# Patient Record
Sex: MALE | Race: WHITE | NOT HISPANIC OR LATINO | URBAN - METROPOLITAN AREA
[De-identification: names, ages, dates, MRNs, and addresses within clinical notes are randomized per-mention and may not be internally consistent; named-entity substitution may affect disease eponyms.]

---

## 2017-02-07 ENCOUNTER — OUTPATIENT (OUTPATIENT)
Dept: OUTPATIENT SERVICES | Facility: HOSPITAL | Age: 47
LOS: 1 days | End: 2017-02-07
Payer: COMMERCIAL

## 2017-02-07 VITALS
TEMPERATURE: 99 F | HEIGHT: 64 IN | SYSTOLIC BLOOD PRESSURE: 135 MMHG | DIASTOLIC BLOOD PRESSURE: 90 MMHG | WEIGHT: 198.42 LBS | OXYGEN SATURATION: 96 % | RESPIRATION RATE: 16 BRPM | HEART RATE: 108 BPM

## 2017-02-07 DIAGNOSIS — G47.33 OBSTRUCTIVE SLEEP APNEA (ADULT) (PEDIATRIC): ICD-10-CM

## 2017-02-07 DIAGNOSIS — Z01.818 ENCOUNTER FOR OTHER PREPROCEDURAL EXAMINATION: ICD-10-CM

## 2017-02-07 DIAGNOSIS — R00.0 TACHYCARDIA, UNSPECIFIED: ICD-10-CM

## 2017-02-07 DIAGNOSIS — S52.032D DISPLACED FRACTURE OF OLECRANON PROCESS WITH INTRAARTICULAR EXTENSION OF LEFT ULNA, SUBSEQUENT ENCOUNTER FOR CLOSED FRACTURE WITH ROUTINE HEALING: ICD-10-CM

## 2017-02-07 DIAGNOSIS — Z90.49 ACQUIRED ABSENCE OF OTHER SPECIFIED PARTS OF DIGESTIVE TRACT: Chronic | ICD-10-CM

## 2017-02-07 DIAGNOSIS — Z96.7 PRESENCE OF OTHER BONE AND TENDON IMPLANTS: Chronic | ICD-10-CM

## 2017-02-07 DIAGNOSIS — S42.409A UNSPECIFIED FRACTURE OF LOWER END OF UNSPECIFIED HUMERUS, INITIAL ENCOUNTER FOR CLOSED FRACTURE: ICD-10-CM

## 2017-02-07 DIAGNOSIS — I10 ESSENTIAL (PRIMARY) HYPERTENSION: ICD-10-CM

## 2017-02-07 DIAGNOSIS — E11.9 TYPE 2 DIABETES MELLITUS WITHOUT COMPLICATIONS: ICD-10-CM

## 2017-02-07 LAB
HCT VFR BLD CALC: 40.7 % — SIGNIFICANT CHANGE UP (ref 39–50)
HGB BLD-MCNC: 13.7 G/DL — SIGNIFICANT CHANGE UP (ref 13–17)
MCHC RBC-ENTMCNC: 28.5 PG — SIGNIFICANT CHANGE UP (ref 27–34)
MCHC RBC-ENTMCNC: 33.7 GM/DL — SIGNIFICANT CHANGE UP (ref 32–36)
MCV RBC AUTO: 84.8 FL — SIGNIFICANT CHANGE UP (ref 80–100)
PLATELET # BLD AUTO: 300 K/UL — SIGNIFICANT CHANGE UP (ref 150–400)
RBC # BLD: 4.8 M/UL — SIGNIFICANT CHANGE UP (ref 4.2–5.8)
RBC # FLD: 13.5 % — SIGNIFICANT CHANGE UP (ref 10.3–14.5)
WBC # BLD: 8.31 K/UL — SIGNIFICANT CHANGE UP (ref 3.8–10.5)
WBC # FLD AUTO: 8.31 K/UL — SIGNIFICANT CHANGE UP (ref 3.8–10.5)

## 2017-02-07 PROCEDURE — 93005 ELECTROCARDIOGRAM TRACING: CPT

## 2017-02-07 PROCEDURE — 85027 COMPLETE CBC AUTOMATED: CPT

## 2017-02-07 PROCEDURE — 83036 HEMOGLOBIN GLYCOSYLATED A1C: CPT

## 2017-02-07 PROCEDURE — G0463: CPT

## 2017-02-07 PROCEDURE — 36415 COLL VENOUS BLD VENIPUNCTURE: CPT

## 2017-02-07 PROCEDURE — 93010 ELECTROCARDIOGRAM REPORT: CPT

## 2017-02-07 PROCEDURE — 80048 BASIC METABOLIC PNL TOTAL CA: CPT

## 2017-02-07 RX ORDER — ASPIRIN/CALCIUM CARB/MAGNESIUM 324 MG
1 TABLET ORAL
Qty: 0 | Refills: 0 | COMMUNITY

## 2017-02-07 RX ORDER — CEFAZOLIN SODIUM 1 G
2000 VIAL (EA) INJECTION ONCE
Qty: 0 | Refills: 0 | Status: DISCONTINUED | OUTPATIENT
Start: 2017-02-08 | End: 2017-02-10

## 2017-02-07 NOTE — H&P PST ADULT - NSANTHOSAYNRD_GEN_A_CORE
No. SALVADOR screening performed.  STOP BANG Legend: 0-2 = LOW Risk; 3-4 = INTERMEDIATE Risk; 5-8 = HIGH Risk

## 2017-02-07 NOTE — H&P PST ADULT - HISTORY OF PRESENT ILLNESS
45 yo white male, PMH HTN, DM2 ( No Home BS monitoring) with mechanical fall at work 8/22/16 with left elbow fracture s/p ORIF of left elbow. Pt. returns to PST today for Removal of Hardware Left Elbow. Pt. denies recent fever, chills, SOB, chest pain, changes in bowel/urinary habits. 47 yo white male, PMH HTN, DM2 ( No Home BS monitoring) with mechanical fall at work 8/16 with left elbow fracture s/p ORIF of left elbow. Pt. reports limited ROM on left arm, pain with certain movement due to hardware. pt. returns to PST today for Removal of Hardware on Left Elbow. Pt. denies recent fever, chills, SOB, chest pain, changes in bowel/urinary habits.

## 2017-02-07 NOTE — H&P PST ADULT - PMH
Essential hypertension    H/O hyperlipidemia    Type 2 diabetes mellitus without complication, unspecified long term insulin use status DM2 (diabetes mellitus, type 2)    Essential hypertension    Fracture of elbow  left,  8/2016  H/O hyperlipidemia

## 2017-02-07 NOTE — H&P PST ADULT - PROBLEM SELECTOR PLAN 2
Pt.'s 's today, denies any symptoms (no lightheadedness, no fainting). Pt. had latte coffee this afternon and is nervous about surgery tomorrow. EKG done and revealed sinus tachycardia in the 114's. Later HR down to 106. Dr. Santillan notified and aware - pt. still denies any symptoms

## 2017-02-07 NOTE — H&P PST ADULT - PROBLEM SELECTOR PLAN 1
Removal of Hardware, Left Elbow  Pre-op teaching, including Chlorhexidine soap, provided - all questions answered  Pt. continued aspirin therapy during gustavo-op period

## 2017-02-07 NOTE — H&P PST ADULT - PSH
History of laparoscopic cholecystectomy  2010  S/P laparoscopic appendectomy  1999 History of laparoscopic cholecystectomy  2010  S/P laparoscopic appendectomy  1999  S/P ORIF (open reduction internal fixation) fracture  left elbow 8/2016

## 2017-02-07 NOTE — H&P PST ADULT - PROBLEM SELECTOR PLAN 4
Pt. takes hypoglycemics in the evening with dinner. FBS on arrival to [facility]

## 2017-02-07 NOTE — H&P PST ADULT - REASON FOR ADMISSION
"they are removing plate and screws from left elbow" "they are removing a plate and screws from left elbow"

## 2017-02-08 ENCOUNTER — INPATIENT (INPATIENT)
Facility: HOSPITAL | Age: 47
LOS: 1 days | Discharge: HOME | DRG: 508 | End: 2017-02-10
Attending: ORTHOPAEDIC SURGERY | Admitting: ORTHOPAEDIC SURGERY
Payer: COMMERCIAL

## 2017-02-08 VITALS
OXYGEN SATURATION: 96 % | HEIGHT: 64 IN | TEMPERATURE: 99 F | SYSTOLIC BLOOD PRESSURE: 122 MMHG | DIASTOLIC BLOOD PRESSURE: 84 MMHG | RESPIRATION RATE: 20 BRPM | HEART RATE: 99 BPM | WEIGHT: 198.42 LBS

## 2017-02-08 DIAGNOSIS — S52.032D DISPLACED FRACTURE OF OLECRANON PROCESS WITH INTRAARTICULAR EXTENSION OF LEFT ULNA, SUBSEQUENT ENCOUNTER FOR CLOSED FRACTURE WITH ROUTINE HEALING: ICD-10-CM

## 2017-02-08 DIAGNOSIS — Z01.818 ENCOUNTER FOR OTHER PREPROCEDURAL EXAMINATION: ICD-10-CM

## 2017-02-08 DIAGNOSIS — Z90.49 ACQUIRED ABSENCE OF OTHER SPECIFIED PARTS OF DIGESTIVE TRACT: Chronic | ICD-10-CM

## 2017-02-08 DIAGNOSIS — Z96.7 PRESENCE OF OTHER BONE AND TENDON IMPLANTS: Chronic | ICD-10-CM

## 2017-02-08 LAB
ANION GAP SERPL CALC-SCNC: 17 MMOL/L — SIGNIFICANT CHANGE UP (ref 5–17)
BUN SERPL-MCNC: 15 MG/DL — SIGNIFICANT CHANGE UP (ref 7–23)
CALCIUM SERPL-MCNC: 9.9 MG/DL — SIGNIFICANT CHANGE UP (ref 8.4–10.5)
CHLORIDE SERPL-SCNC: 97 MMOL/L — SIGNIFICANT CHANGE UP (ref 96–108)
CO2 SERPL-SCNC: 23 MMOL/L — SIGNIFICANT CHANGE UP (ref 22–31)
CREAT SERPL-MCNC: 0.91 MG/DL — SIGNIFICANT CHANGE UP (ref 0.5–1.3)
GLUCOSE SERPL-MCNC: 221 MG/DL — HIGH (ref 70–99)
HBA1C BLD-MCNC: 8.5 % — HIGH (ref 4–5.6)
POTASSIUM SERPL-MCNC: 4.4 MMOL/L — SIGNIFICANT CHANGE UP (ref 3.5–5.3)
POTASSIUM SERPL-SCNC: 4.4 MMOL/L — SIGNIFICANT CHANGE UP (ref 3.5–5.3)
SODIUM SERPL-SCNC: 137 MMOL/L — SIGNIFICANT CHANGE UP (ref 135–145)

## 2017-02-08 RX ORDER — ASPIRIN/CALCIUM CARB/MAGNESIUM 324 MG
81 TABLET ORAL DAILY
Qty: 0 | Refills: 0 | Status: DISCONTINUED | OUTPATIENT
Start: 2017-02-08 | End: 2017-02-10

## 2017-02-08 RX ORDER — FAMOTIDINE 10 MG/ML
20 INJECTION INTRAVENOUS ONCE
Qty: 0 | Refills: 0 | Status: COMPLETED | OUTPATIENT
Start: 2017-02-08 | End: 2017-02-08

## 2017-02-08 RX ORDER — METFORMIN HYDROCHLORIDE 850 MG/1
1000 TABLET ORAL
Qty: 0 | Refills: 0 | Status: DISCONTINUED | OUTPATIENT
Start: 2017-02-08 | End: 2017-02-08

## 2017-02-08 RX ORDER — DEXTROSE 50 % IN WATER 50 %
12.5 SYRINGE (ML) INTRAVENOUS ONCE
Qty: 0 | Refills: 0 | Status: DISCONTINUED | OUTPATIENT
Start: 2017-02-08 | End: 2017-02-10

## 2017-02-08 RX ORDER — LOSARTAN POTASSIUM 100 MG/1
50 TABLET, FILM COATED ORAL AT BEDTIME
Qty: 0 | Refills: 0 | Status: DISCONTINUED | OUTPATIENT
Start: 2017-02-08 | End: 2017-02-10

## 2017-02-08 RX ORDER — ATORVASTATIN CALCIUM 80 MG/1
80 TABLET, FILM COATED ORAL AT BEDTIME
Qty: 0 | Refills: 0 | Status: DISCONTINUED | OUTPATIENT
Start: 2017-02-08 | End: 2017-02-10

## 2017-02-08 RX ORDER — DEXTROSE 50 % IN WATER 50 %
1 SYRINGE (ML) INTRAVENOUS ONCE
Qty: 0 | Refills: 0 | Status: DISCONTINUED | OUTPATIENT
Start: 2017-02-08 | End: 2017-02-10

## 2017-02-08 RX ORDER — ACETAMINOPHEN 500 MG
650 TABLET ORAL EVERY 6 HOURS
Qty: 0 | Refills: 0 | Status: DISCONTINUED | OUTPATIENT
Start: 2017-02-08 | End: 2017-02-10

## 2017-02-08 RX ORDER — INSULIN LISPRO 100/ML
VIAL (ML) SUBCUTANEOUS
Qty: 0 | Refills: 0 | Status: DISCONTINUED | OUTPATIENT
Start: 2017-02-08 | End: 2017-02-08

## 2017-02-08 RX ORDER — CEFAZOLIN SODIUM 1 G
2000 VIAL (EA) INJECTION EVERY 8 HOURS
Qty: 0 | Refills: 0 | Status: COMPLETED | OUTPATIENT
Start: 2017-02-08 | End: 2017-02-08

## 2017-02-08 RX ORDER — HYDROMORPHONE HYDROCHLORIDE 2 MG/ML
0.5 INJECTION INTRAMUSCULAR; INTRAVENOUS; SUBCUTANEOUS
Qty: 0 | Refills: 0 | Status: DISCONTINUED | OUTPATIENT
Start: 2017-02-08 | End: 2017-02-08

## 2017-02-08 RX ORDER — ASPIRIN/CALCIUM CARB/MAGNESIUM 324 MG
1 TABLET ORAL
Qty: 0 | Refills: 0 | COMMUNITY

## 2017-02-08 RX ORDER — ONDANSETRON 8 MG/1
4 TABLET, FILM COATED ORAL EVERY 6 HOURS
Qty: 0 | Refills: 0 | Status: DISCONTINUED | OUTPATIENT
Start: 2017-02-08 | End: 2017-02-10

## 2017-02-08 RX ORDER — SODIUM CHLORIDE 9 MG/ML
1000 INJECTION INTRAMUSCULAR; INTRAVENOUS; SUBCUTANEOUS
Qty: 0 | Refills: 0 | Status: DISCONTINUED | OUTPATIENT
Start: 2017-02-08 | End: 2017-02-10

## 2017-02-08 RX ORDER — INSULIN LISPRO 100/ML
VIAL (ML) SUBCUTANEOUS AT BEDTIME
Qty: 0 | Refills: 0 | Status: DISCONTINUED | OUTPATIENT
Start: 2017-02-08 | End: 2017-02-10

## 2017-02-08 RX ORDER — OXYCODONE HYDROCHLORIDE 5 MG/1
5 TABLET ORAL EVERY 4 HOURS
Qty: 0 | Refills: 0 | Status: DISCONTINUED | OUTPATIENT
Start: 2017-02-08 | End: 2017-02-10

## 2017-02-08 RX ORDER — DEXTROSE 50 % IN WATER 50 %
25 SYRINGE (ML) INTRAVENOUS ONCE
Qty: 0 | Refills: 0 | Status: DISCONTINUED | OUTPATIENT
Start: 2017-02-08 | End: 2017-02-10

## 2017-02-08 RX ORDER — DOCUSATE SODIUM 100 MG
100 CAPSULE ORAL THREE TIMES A DAY
Qty: 0 | Refills: 0 | Status: DISCONTINUED | OUTPATIENT
Start: 2017-02-08 | End: 2017-02-10

## 2017-02-08 RX ORDER — ROSUVASTATIN CALCIUM 5 MG/1
1 TABLET ORAL
Qty: 0 | Refills: 0 | COMMUNITY

## 2017-02-08 RX ORDER — HYDROMORPHONE HYDROCHLORIDE 2 MG/ML
0.5 INJECTION INTRAMUSCULAR; INTRAVENOUS; SUBCUTANEOUS EVERY 6 HOURS
Qty: 0 | Refills: 0 | Status: DISCONTINUED | OUTPATIENT
Start: 2017-02-08 | End: 2017-02-10

## 2017-02-08 RX ORDER — SODIUM CHLORIDE 9 MG/ML
500 INJECTION INTRAMUSCULAR; INTRAVENOUS; SUBCUTANEOUS ONCE
Qty: 0 | Refills: 0 | Status: COMPLETED | OUTPATIENT
Start: 2017-02-08 | End: 2017-02-08

## 2017-02-08 RX ORDER — SODIUM CHLORIDE 9 MG/ML
3 INJECTION INTRAMUSCULAR; INTRAVENOUS; SUBCUTANEOUS EVERY 8 HOURS
Qty: 0 | Refills: 0 | Status: DISCONTINUED | OUTPATIENT
Start: 2017-02-08 | End: 2017-02-08

## 2017-02-08 RX ORDER — SODIUM CHLORIDE 9 MG/ML
1000 INJECTION, SOLUTION INTRAVENOUS
Qty: 0 | Refills: 0 | Status: DISCONTINUED | OUTPATIENT
Start: 2017-02-08 | End: 2017-02-10

## 2017-02-08 RX ORDER — METFORMIN HYDROCHLORIDE 850 MG/1
500 TABLET ORAL
Qty: 0 | Refills: 0 | Status: DISCONTINUED | OUTPATIENT
Start: 2017-02-08 | End: 2017-02-08

## 2017-02-08 RX ORDER — GLUCAGON INJECTION, SOLUTION 0.5 MG/.1ML
1 INJECTION, SOLUTION SUBCUTANEOUS ONCE
Qty: 0 | Refills: 0 | Status: DISCONTINUED | OUTPATIENT
Start: 2017-02-08 | End: 2017-02-10

## 2017-02-08 RX ORDER — ONDANSETRON 8 MG/1
4 TABLET, FILM COATED ORAL ONCE
Qty: 0 | Refills: 0 | Status: COMPLETED | OUTPATIENT
Start: 2017-02-08 | End: 2017-02-08

## 2017-02-08 RX ORDER — LOSARTAN POTASSIUM 100 MG/1
1 TABLET, FILM COATED ORAL
Qty: 0 | Refills: 0 | COMMUNITY

## 2017-02-08 RX ORDER — SODIUM CHLORIDE 9 MG/ML
1000 INJECTION, SOLUTION INTRAVENOUS
Qty: 0 | Refills: 0 | Status: DISCONTINUED | OUTPATIENT
Start: 2017-02-08 | End: 2017-02-08

## 2017-02-08 RX ORDER — OXYCODONE HYDROCHLORIDE 5 MG/1
10 TABLET ORAL EVERY 4 HOURS
Qty: 0 | Refills: 0 | Status: DISCONTINUED | OUTPATIENT
Start: 2017-02-08 | End: 2017-02-10

## 2017-02-08 RX ORDER — INSULIN LISPRO 100/ML
VIAL (ML) SUBCUTANEOUS
Qty: 0 | Refills: 0 | Status: DISCONTINUED | OUTPATIENT
Start: 2017-02-08 | End: 2017-02-10

## 2017-02-08 RX ADMIN — SODIUM CHLORIDE 85 MILLILITER(S): 9 INJECTION INTRAMUSCULAR; INTRAVENOUS; SUBCUTANEOUS at 18:00

## 2017-02-08 RX ADMIN — SODIUM CHLORIDE 3 MILLILITER(S): 9 INJECTION INTRAMUSCULAR; INTRAVENOUS; SUBCUTANEOUS at 08:02

## 2017-02-08 RX ADMIN — HYDROMORPHONE HYDROCHLORIDE 0.5 MILLIGRAM(S): 2 INJECTION INTRAMUSCULAR; INTRAVENOUS; SUBCUTANEOUS at 15:15

## 2017-02-08 RX ADMIN — ATORVASTATIN CALCIUM 80 MILLIGRAM(S): 80 TABLET, FILM COATED ORAL at 21:07

## 2017-02-08 RX ADMIN — OXYCODONE HYDROCHLORIDE 5 MILLIGRAM(S): 5 TABLET ORAL at 23:40

## 2017-02-08 RX ADMIN — LOSARTAN POTASSIUM 50 MILLIGRAM(S): 100 TABLET, FILM COATED ORAL at 21:07

## 2017-02-08 RX ADMIN — SODIUM CHLORIDE 600 MILLILITER(S): 9 INJECTION INTRAMUSCULAR; INTRAVENOUS; SUBCUTANEOUS at 16:45

## 2017-02-08 RX ADMIN — FAMOTIDINE 20 MILLIGRAM(S): 10 INJECTION INTRAVENOUS at 17:30

## 2017-02-08 RX ADMIN — ONDANSETRON 4 MILLIGRAM(S): 8 TABLET, FILM COATED ORAL at 16:30

## 2017-02-08 RX ADMIN — Medication 81 MILLIGRAM(S): at 12:03

## 2017-02-08 RX ADMIN — ONDANSETRON 4 MILLIGRAM(S): 8 TABLET, FILM COATED ORAL at 17:30

## 2017-02-08 RX ADMIN — Medication 100 MILLIGRAM(S): at 23:12

## 2017-02-08 RX ADMIN — SODIUM CHLORIDE 75 MILLILITER(S): 9 INJECTION, SOLUTION INTRAVENOUS at 09:35

## 2017-02-08 RX ADMIN — OXYCODONE HYDROCHLORIDE 5 MILLIGRAM(S): 5 TABLET ORAL at 23:10

## 2017-02-08 RX ADMIN — Medication 100 MILLIGRAM(S): at 16:35

## 2017-02-08 RX ADMIN — HYDROMORPHONE HYDROCHLORIDE 0.5 MILLIGRAM(S): 2 INJECTION INTRAMUSCULAR; INTRAVENOUS; SUBCUTANEOUS at 15:30

## 2017-02-08 RX ADMIN — Medication 3: at 18:19

## 2017-02-08 RX ADMIN — Medication 2: at 12:02

## 2017-02-08 NOTE — BRIEF OPERATIVE NOTE - POST-OP DX
Contracture of left elbow  02/08/2017    Active  Cristy Fishman  Infection/inflammation due to internal orthopedic device/implant/graft  02/08/2017  painful harware left elbow  Active  Cristy Fishman

## 2017-02-08 NOTE — BRIEF OPERATIVE NOTE - PRE-OP DX
Contracture, left elbow  02/08/2017    Active  Cristy Fishman  Infection/inflammation due to internal orthopedic device/implant/graft  02/08/2017  painful harware left elbow  Active  Cristy Fishman

## 2017-02-09 RX ORDER — DOCUSATE SODIUM 100 MG
1 CAPSULE ORAL
Qty: 0 | Refills: 0 | COMMUNITY
Start: 2017-02-09

## 2017-02-09 RX ORDER — INFLUENZA VIRUS VACCINE 15; 15; 15; 15 UG/.5ML; UG/.5ML; UG/.5ML; UG/.5ML
0.5 SUSPENSION INTRAMUSCULAR ONCE
Qty: 0 | Refills: 0 | Status: DISCONTINUED | OUTPATIENT
Start: 2017-02-09 | End: 2017-02-10

## 2017-02-09 RX ORDER — ACETAMINOPHEN 500 MG
2 TABLET ORAL
Qty: 0 | Refills: 0 | COMMUNITY
Start: 2017-02-09

## 2017-02-09 RX ORDER — OXYCODONE HYDROCHLORIDE 5 MG/1
1 TABLET ORAL
Qty: 0 | Refills: 0 | COMMUNITY
Start: 2017-02-09

## 2017-02-09 RX ADMIN — OXYCODONE HYDROCHLORIDE 5 MILLIGRAM(S): 5 TABLET ORAL at 05:16

## 2017-02-09 RX ADMIN — Medication 2: at 16:40

## 2017-02-09 RX ADMIN — SODIUM CHLORIDE 85 MILLILITER(S): 9 INJECTION INTRAMUSCULAR; INTRAVENOUS; SUBCUTANEOUS at 05:17

## 2017-02-09 RX ADMIN — OXYCODONE HYDROCHLORIDE 5 MILLIGRAM(S): 5 TABLET ORAL at 05:46

## 2017-02-09 RX ADMIN — OXYCODONE HYDROCHLORIDE 5 MILLIGRAM(S): 5 TABLET ORAL at 23:32

## 2017-02-09 RX ADMIN — Medication 81 MILLIGRAM(S): at 12:00

## 2017-02-09 RX ADMIN — Medication 2: at 12:00

## 2017-02-09 RX ADMIN — Medication 1: at 07:58

## 2017-02-09 RX ADMIN — OXYCODONE HYDROCHLORIDE 5 MILLIGRAM(S): 5 TABLET ORAL at 09:41

## 2017-02-09 RX ADMIN — LOSARTAN POTASSIUM 50 MILLIGRAM(S): 100 TABLET, FILM COATED ORAL at 22:08

## 2017-02-09 RX ADMIN — OXYCODONE HYDROCHLORIDE 5 MILLIGRAM(S): 5 TABLET ORAL at 10:10

## 2017-02-09 RX ADMIN — ATORVASTATIN CALCIUM 80 MILLIGRAM(S): 80 TABLET, FILM COATED ORAL at 22:08

## 2017-02-09 NOTE — PATIENT PROFILE ADULT. - PSH
History of laparoscopic cholecystectomy  2010  S/P laparoscopic appendectomy  1999  S/P ORIF (open reduction internal fixation) fracture  left elbow 8/2016

## 2017-02-09 NOTE — DISCHARGE NOTE ADULT - PLAN OF CARE
pain free range of motion DIET: resume consistent carbohydrate diet regimen (diabetid diet)  DVT PROPHYLAXIS: aspirin 81mg daily  WEIGHT-BEARING STATUS: weight bearing as tolerated left arm  BATHING: keep dressing clean and dry   DRESSING CHANGES: every 2 days until incision dry DIET: resume consistent carbohydrate diet regimen (diabetic diet)  DVT PROPHYLAXIS: aspirin 81mg daily  WEIGHT-BEARING STATUS: weight bearing as tolerated left arm  BATHING: keep dressing clean and dry   DRESSING CHANGES: every 2 days until incision dry

## 2017-02-09 NOTE — PATIENT PROFILE ADULT. - PMH
DM2 (diabetes mellitus, type 2)    Essential hypertension    Fracture of elbow  left,  8/2016  H/O hyperlipidemia

## 2017-02-09 NOTE — DISCHARGE NOTE ADULT - ADDITIONAL INSTRUCTIONS
- Follow up with Dr. Souza in 10-14 days after surgery; call office for appointment upon discharge  - - Please have staples/sutures removed by physician 10-14 days after surgery if applicable  - - please follow up with your primary care doctor after discharge from hospital to discuss your hospital stay and any changes to you medications -Keep surgical incision/dressing clean and dry  - Follow up with Dr. Souza in 10-14 days after surgery for wound check and suture removal; call office for appointment upon discharge  -Patient  to follow up with PMD Dr Soliman with in 1 week,  to address patients elevated A1C

## 2017-02-09 NOTE — PHYSICAL THERAPY INITIAL EVALUATION ADULT - PLANNED THERAPY INTERVENTIONS, PT EVAL
Patient is independent with all functional mobility. Patient with no acute care skilled PT needs, will be d/c from PT program at this time.

## 2017-02-09 NOTE — DISCHARGE NOTE ADULT - PATIENT PORTAL LINK FT
“You can access the [facility] Patient Portal, offered by [facility], by registering with the following website: [email or web address]”

## 2017-02-09 NOTE — OCCUPATIONAL THERAPY INITIAL EVALUATION ADULT - RANGE OF MOTION EXAMINATION, UPPER EXTREMITY
L shoulder/wrist/digits AROM WFL; R elbow ~ degrees/Right UE Active ROM was WFL (within functional limits) Right UE Active ROM was WFL (within functional limits)/L shoulder/wrist/digits AROM WFL; L elbow ~ degrees

## 2017-02-09 NOTE — PHYSICAL THERAPY INITIAL EVALUATION ADULT - ADDITIONAL COMMENTS
Patient resides in an apartment with family. 4 steps to enter, 8 inside. (+) rail. Prior to admission patient was independent with ambulation and all functional activities. Pt states he had limitations with L UE use secondary to pain.

## 2017-02-09 NOTE — DISCHARGE NOTE ADULT - MEDICATION SUMMARY - MEDICATIONS TO TAKE
I will START or STAY ON the medications listed below when I get home from the hospital:    acetaminophen 325 mg oral tablet  -- 2 tab(s) by mouth every 6 hours, As needed, For Temp greater than 38 C (100.4 F)  -- Indication: For fever, H/A    acetaminophen 325 mg oral tablet  -- 2 tab(s) by mouth every 6 hours, As needed, Mild Pain (1 - 3)  -- Indication: For mild pain    oxyCODONE 5 mg oral tablet  -- 1 tab(s) by mouth every 4 hours, As needed, Moderate Pain -for moderate pain MDD:6  -- Indication: For moderate pain    aspirin 81 mg oral tablet  -- 1 tab(s) by mouth once a day (in the evening)  -- Indication: For antiplatelet therapy    losartan 50 mg oral tablet  -- 1 tab(s) by mouth once a day (in the evening)  -- Indication: For Cardiovascular agent    Janumet  mg-1000 mg oral tablet, extended release  -- 1 tab(s) by mouth once a day (in the evening) with dinner  -- Indication: For antidiabetic agent    Crestor 20 mg oral tablet  -- 1 tab(s) by mouth once a day (in the evening)  -- Indication: For antihyperlipidemic agent

## 2017-02-09 NOTE — PHYSICAL THERAPY INITIAL EVALUATION ADULT - ACTIVE RANGE OF MOTION EXAMINATION, REHAB EVAL
Right UE Active ROM was WFL (within functional limits)/Left LE Active ROM was WFL (within functional limits)/Right LE Active ROM was WFL (within functional limits)/L shoulder WFL, L elbow ~ degrees, L wrist WFL

## 2017-02-09 NOTE — OCCUPATIONAL THERAPY INITIAL EVALUATION ADULT - PERTINENT HX OF CURRENT PROBLEM, REHAB EVAL
47 yo M with mechanical fall at work 8/16 with L elbow fracture s/p ORIF of left elbow. Pt. reports limited ROM on left arm, pain with certain movement due to hardware. pt. returns to PST today for Removal of Hardware on Left Elbow.

## 2017-02-09 NOTE — OCCUPATIONAL THERAPY INITIAL EVALUATION ADULT - LIVES WITH, PROFILE
children/spouse/Pt lives with his wife and daughter in an apartment, 4 steps to enter, eight steps inside with handrail. Pt has a tub shower and did not use AE prior to admission. Pt was independent with ADLs, IADLs, driving.

## 2017-02-09 NOTE — PHYSICAL THERAPY INITIAL EVALUATION ADULT - PERTINENT HX OF CURRENT PROBLEM, REHAB EVAL
Patient is a 46 yr old M, PMH HTN, DM2, with mechanical fall at work 8/16 with left elbow fracture s/p ORIF of left elbow. Pt no presents with reports of limited ROM at left arm and pain with certain movement due to hardware. Pt here for surgery, s/p above procedure on 2/8.

## 2017-02-09 NOTE — DISCHARGE NOTE ADULT - CARE PLAN
Principal Discharge DX:	Pain from implanted hardware, initial encounter  Goal:	pain free range of motion  Instructions for follow-up, activity and diet:	DIET: resume consistent carbohydrate diet regimen (diabetid diet)  DVT PROPHYLAXIS: aspirin 81mg daily  WEIGHT-BEARING STATUS: weight bearing as tolerated left arm  BATHING: keep dressing clean and dry   DRESSING CHANGES: every 2 days until incision dry Principal Discharge DX:	Pain from implanted hardware, initial encounter  Goal:	pain free range of motion  Instructions for follow-up, activity and diet:	DIET: resume consistent carbohydrate diet regimen (diabetic diet)  DVT PROPHYLAXIS: aspirin 81mg daily  WEIGHT-BEARING STATUS: weight bearing as tolerated left arm  BATHING: keep dressing clean and dry   DRESSING CHANGES: every 2 days until incision dry

## 2017-02-09 NOTE — OCCUPATIONAL THERAPY INITIAL EVALUATION ADULT - ANTICIPATED DISCHARGE DISPOSITION, OT EVAL
Pt would benefit from outpatient OT once medically cleared to increase ROM and strength of L UE for ADLs and IADLs./home w/ outpatient

## 2017-02-09 NOTE — DISCHARGE NOTE ADULT - NS AS ACTIVITY OBS
weight bearing as tolerated left arm/No Heavy lifting/straining/Walking-Indoors allowed/Walking-Outdoors allowed/Stairs allowed

## 2017-02-09 NOTE — DISCHARGE NOTE ADULT - HOSPITAL COURSE
· Primary Care Provider	Dr. Soliman     Chief Complaint/Reason for Visit/HPI:   Chief Complaint/Reason for Visit:  Chief Complaint/Reason for Admission	"they are removing a plate and screws from left elbow"    History of Present Illness:  History of Present Illness	  45 yo white male, PMH HTN, DM2 ( No Home BS monitoring) with mechanical fall at work 8/16 with left elbow fracture s/p ORIF of left elbow. Pt. reports limited ROM on left arm, pain with certain movement due to hardware. pt. returns to [facility] today for Removal of Hardware on Left Elbow. Pt. denies recent fever, chills, SOB, chest pain, changes in bowel/urinary habits.    Allergies/Medications:   Allergies:        Allergies:  	No Known Allergies:     Home Medications:   * Patient Currently Takes Medications as of 07-Feb-2017 15:28 documented in Prescription Writer  · 	Janumet  mg-1000 mg oral tablet, extended release: Last Dose Taken:  , 1 tab(s) orally once a day (in the evening) with dinner  · 	Crestor 20 mg oral tablet: Last Dose Taken:  , 1 tab(s) orally once a day (in the evening)  · 	losartan 50 mg oral tablet: Last Dose Taken:  , 1 tab(s) orally once a day (in the evening)  · 	aspirin 81 mg oral tablet: Last Dose Taken:  , 1 tab(s) orally once a day (in the evening)    PMH/PSH/FH/SH:   Past Medical History:  DM2 (diabetes mellitus, type 2)    Essential hypertension    Fracture of elbow  left,  8/2016  H/O hyperlipidemia.    Past Surgical History:  History of laparoscopic cholecystectomy  2010  S/P laparoscopic appendectomy  1999  S/P ORIF (open reduction internal fixation) fracture  left elbow 8/2016.    Hospital Course:  2/8: Admitted to [facility]; underwent removal of hardware left elbow; tolerated procedure well   2/9: evaluated by physical therapy/occupational therapy who recommended: home  - hemovac drain was removed, tolerated well  2/10: stable for discharge home  Discharge H/H: · Primary Care Provider	Dr. Soliman     Chief Complaint/Reason for Visit/HPI:   Chief Complaint/Reason for Visit:  Chief Complaint/Reason for Admission	"they are removing a plate and screws from left elbow"    History of Present Illness:  History of Present Illness	  45 yo white male, PMH HTN, DM2 ( No Home BS monitoring) with mechanical fall at work 8/16 with left elbow fracture s/p ORIF of left elbow. Pt. reports limited ROM on left arm, pain with certain movement due to hardware. pt. returns to [facility] today for Removal of Hardware on Left Elbow. Pt. denies recent fever, chills, SOB, chest pain, changes in bowel/urinary habits.    Allergies/Medications:   Allergies:        Allergies:  	No Known Allergies:     Home Medications:   * Patient Currently Takes Medications as of 07-Feb-2017 15:28 documented in Prescription Writer  · 	Janumet  mg-1000 mg oral tablet, extended release: Last Dose Taken:  , 1 tab(s) orally once a day (in the evening) with dinner  · 	Crestor 20 mg oral tablet: Last Dose Taken:  , 1 tab(s) orally once a day (in the evening)  · 	losartan 50 mg oral tablet: Last Dose Taken:  , 1 tab(s) orally once a day (in the evening)  · 	aspirin 81 mg oral tablet: Last Dose Taken:  , 1 tab(s) orally once a day (in the evening)    PMH/PSH/FH/SH:   Past Medical History:  DM2 (diabetes mellitus, type 2)    Essential hypertension    Fracture of elbow  left,  8/2016  H/O hyperlipidemia.    Past Surgical History:  History of laparoscopic cholecystectomy  2010  S/P laparoscopic appendectomy  1999  S/P ORIF (open reduction internal fixation) fracture  left elbow 8/2016.    Hospital Course:  2/8: Admitted to [facility]; underwent removal of hardware left elbow; tolerated procedure well   2/9: evaluated by physical therapy/occupational therapy who recommended: home  - hemovac drain was removed, tolerated well  2/10: stable for discharge home  Discharge H/H: 13.7/40.7 on 2/7/17 · Primary Care Provider	Dr. Soliman     Chief Complaint/Reason for Visit/HPI:   Chief Complaint/Reason for Visit:  Chief Complaint/Reason for Admission	"they are removing a plate and screws from left elbow"    History of Present Illness:  History of Present Illness	  45 yo white male, PMH HTN, DM2 ( No Home BS monitoring) with mechanical fall at work 8/16 with left elbow fracture s/p ORIF of left elbow. Pt. reports limited ROM on left arm, pain with certain movement due to hardware. pt. returns to PST today for Removal of Hardware on Left Elbow. Pt. denies recent fever, chills, SOB, chest pain, changes in bowel/urinary habits.    Allergies/Medications:   Allergies:        Allergies:  	No Known Allergies:     Home Medications:   * Patient Currently Takes Medications as of 07-Feb-2017 15:28 documented in Prescription Writer  · 	Janumet  mg-1000 mg oral tablet, extended release: Last Dose Taken:  , 1 tab(s) orally once a day (in the evening) with dinner  · 	Crestor 20 mg oral tablet: Last Dose Taken:  , 1 tab(s) orally once a day (in the evening)  · 	losartan 50 mg oral tablet: Last Dose Taken:  , 1 tab(s) orally once a day (in the evening)  · 	aspirin 81 mg oral tablet: Last Dose Taken:  , 1 tab(s) orally once a day (in the evening)    PMH/PSH/FH/SH:   Past Medical History:  DM2 (diabetes mellitus, type 2)    Essential hypertension    Fracture of elbow  left,  8/2016  H/O hyperlipidemia.    Past Surgical History:  History of laparoscopic cholecystectomy  2010  S/P laparoscopic appendectomy  1999  S/P ORIF (open reduction internal fixation) fracture  left elbow 8/2016.    Hospital Course:  2/8: Admitted to [facility]; underwent removal of hardware left elbow; tolerated procedure well   2/9: evaluated by physical therapy/occupational therapy who recommended: home  - hemovac drain was removed, tolerated well  2/10: stable for discharge home  Discharge H/H: 13.7/40.7 on 2/7/17  Patient to follow up with Dr Souza 12-14 days post op, and follow up with PMD, Dr Soliman within 1 weeks of discharge to address patients elevated A1C

## 2017-02-09 NOTE — OCCUPATIONAL THERAPY INITIAL EVALUATION ADULT - MANUAL MUSCLE TESTING RESULTS, REHAB EVAL
grossly assessed due to/4+/5; except L elbow 2/5; L digit flexion 3/5 4/5; except L elbow 2/5; L digit flexion 3/5/grossly assessed due to

## 2017-02-09 NOTE — DISCHARGE NOTE ADULT - CARE PROVIDER_API CALL
Wilberto Souza), Orthopaedic Surgery  [address] [place] [id number]  Phone: [phone]  Fax: [phone]

## 2017-02-10 VITALS
OXYGEN SATURATION: 98 % | DIASTOLIC BLOOD PRESSURE: 84 MMHG | RESPIRATION RATE: 18 BRPM | SYSTOLIC BLOOD PRESSURE: 131 MMHG | HEART RATE: 87 BPM | TEMPERATURE: 99 F

## 2017-02-10 RX ORDER — OXYCODONE HYDROCHLORIDE 5 MG/1
1 TABLET ORAL
Qty: 42 | Refills: 0 | OUTPATIENT
Start: 2017-02-10 | End: 2017-02-17

## 2017-02-10 RX ADMIN — OXYCODONE HYDROCHLORIDE 5 MILLIGRAM(S): 5 TABLET ORAL at 00:01

## 2017-02-10 RX ADMIN — Medication 1: at 08:04

## 2017-03-12 PROCEDURE — 97161 PT EVAL LOW COMPLEX 20 MIN: CPT

## 2017-03-12 PROCEDURE — 97165 OT EVAL LOW COMPLEX 30 MIN: CPT

## 2017-03-12 PROCEDURE — 97110 THERAPEUTIC EXERCISES: CPT

## 2017-03-12 PROCEDURE — 97535 SELF CARE MNGMENT TRAINING: CPT
